# Patient Record
Sex: MALE | Race: WHITE | NOT HISPANIC OR LATINO | Employment: FULL TIME | URBAN - NONMETROPOLITAN AREA
[De-identification: names, ages, dates, MRNs, and addresses within clinical notes are randomized per-mention and may not be internally consistent; named-entity substitution may affect disease eponyms.]

---
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
## 2019-11-26 ENCOUNTER — OFFICE VISIT (OUTPATIENT)
Dept: FAMILY MEDICINE CLINIC | Facility: CLINIC | Age: 19
End: 2019-11-26

## 2019-11-26 VITALS
SYSTOLIC BLOOD PRESSURE: 139 MMHG | DIASTOLIC BLOOD PRESSURE: 86 MMHG | TEMPERATURE: 98.5 F | WEIGHT: 262 LBS | BODY MASS INDEX: 35.49 KG/M2 | HEIGHT: 72 IN | HEART RATE: 79 BPM

## 2019-11-26 DIAGNOSIS — I10 ESSENTIAL HYPERTENSION: Primary | Chronic | ICD-10-CM

## 2019-11-26 DIAGNOSIS — G43.009 MIGRAINE WITHOUT AURA AND WITHOUT STATUS MIGRAINOSUS, NOT INTRACTABLE: Chronic | ICD-10-CM

## 2019-11-26 PROCEDURE — 99213 OFFICE O/P EST LOW 20 MIN: CPT | Performed by: INTERNAL MEDICINE

## 2019-11-26 RX ORDER — VERAPAMIL HYDROCHLORIDE 40 MG/1
20 TABLET ORAL
COMMUNITY
Start: 2019-09-08 | End: 2019-11-26 | Stop reason: SDUPTHER

## 2019-11-26 RX ORDER — VERAPAMIL HYDROCHLORIDE 40 MG/1
20 TABLET ORAL 2 TIMES DAILY
Qty: 60 TABLET | Refills: 5 | Status: SHIPPED | OUTPATIENT
Start: 2019-11-26 | End: 2021-10-19

## 2019-11-26 NOTE — PROGRESS NOTES
Chief Complaint   Patient presents with   • Follow-up     [facility], Migraine     Subjective   Aaron Colunga is a 18 y.o. male who presents to the office for an urgent care follow-up.  He was seen in the urgent care center at [facility] on 9/8/2019.  He was having a headache and was found to have an elevated blood pressure.  He was diagnosed with migraine headache and hypertension.  His blood pressure at that time was 167/84.  He was started on verapamil 20 mg twice daily.  Since then, his blood pressure has been doing well.  He is tolerating the medication without any side effects.  His headaches have also improved significantly.  He occasionally still has a migraine headache, but it responds well to Tylenol.    The following portions of the patient's history were reviewed and updated as appropriate: allergies, current medications, past family history, past medical history, past social history, past surgical history and problem list.    Review of Systems   Constitutional: Negative for chills, fatigue and fever.   HENT: Negative for congestion, sneezing, sore throat and trouble swallowing.    Eyes: Negative for visual disturbance.   Respiratory: Negative for cough, chest tightness, shortness of breath and wheezing.    Cardiovascular: Negative for chest pain, palpitations and leg swelling.   Gastrointestinal: Negative for abdominal pain, constipation, diarrhea, nausea and vomiting.   Genitourinary: Negative for dysuria, frequency and urgency.   Musculoskeletal: Negative for neck pain.   Skin: Negative for rash.   Neurological: Positive for headaches. Negative for dizziness and weakness.   Psychiatric/Behavioral:        Patient denies any feelings of depression and has not felt down, hopeless or lost interest in any activities.   All other systems reviewed and are negative.      Objective   Vitals:    11/26/19 0943   BP: 139/86   BP Location: Left arm   Patient Position: Sitting   Cuff  "Size: Adult   Pulse: 79   Temp: 98.5 °F (36.9 °C)   TempSrc: Oral   Weight: 119 kg (262 lb)   Height: 182.9 cm (72\")   PainSc: 0-No pain      Body mass index is 35.53 kg/m².  Physical Exam   Constitutional: He is oriented to person, place, and time. He appears well-developed and well-nourished. No distress.   HENT:   Head: Normocephalic and atraumatic.   Nose: Nose normal.   Mouth/Throat: Oropharynx is clear and moist. No oropharyngeal exudate.   Eyes: Conjunctivae and EOM are normal. Pupils are equal, round, and reactive to light. No scleral icterus.   Neck: Normal range of motion. Neck supple.   Cardiovascular: Normal rate, regular rhythm and normal heart sounds. Exam reveals no gallop and no friction rub.   No murmur heard.  Pulmonary/Chest: Effort normal and breath sounds normal. No respiratory distress. He has no wheezes. He has no rales.   Abdominal: Soft. Bowel sounds are normal. He exhibits no distension. There is no tenderness. There is no rebound and no guarding.   Musculoskeletal: Normal range of motion. He exhibits no edema.   Lymphadenopathy:     He has no cervical adenopathy.   Neurological: He is alert and oriented to person, place, and time. No cranial nerve deficit.   Skin: Skin is warm and dry. No rash noted.   Psychiatric: He has a normal mood and affect. His behavior is normal. Judgment and thought content normal.   Nursing note and vitals reviewed.      Assessment/Plan   Aaron was seen today for follow-up.    Diagnoses and all orders for this visit:    Essential hypertension      I obtained records from the urgent care visit.  These are reviewed and discussed above in the history of present illness.  He has not had any labs in the past couple of years.  Since he has now developed hypertension, he will come in fasting for updated lab studies.  His blood pressure is controlled.  He will continue with the current dose of verapamil for treatment of the hypertension and migraine " headache.    Patient's Body mass index is 35.53 kg/m². BMI is above normal parameters. Recommendations include: educational material.    Patient does not want a flu shot today.       PHQ-2/PHQ-9 Depression Screening 11/26/2019   Little interest or pleasure in doing things 0   Feeling down, depressed, or hopeless 0   Trouble falling or staying asleep, or sleeping too much -   Feeling tired or having little energy -   Poor appetite or overeating -   Feeling bad about yourself - or that you are a failure or have let yourself or your family down -   Trouble concentrating on things, such as reading the newspaper or watching television -   Moving or speaking so slowly that other people could have noticed. Or the opposite - being so fidgety or restless that you have been moving around a lot more than usual -   Thoughts that you would be better off dead, or of hurting yourself in some way -   Total Score 0   If you checked off any problems, how difficult have these problems made it for you to do your work, take care of things at home, or get along with other people? -

## 2019-11-26 NOTE — PATIENT INSTRUCTIONS

## 2019-12-23 ENCOUNTER — OFFICE VISIT (OUTPATIENT)
Dept: FAMILY MEDICINE CLINIC | Facility: CLINIC | Age: 19
End: 2019-12-23

## 2019-12-23 VITALS
WEIGHT: 264 LBS | BODY MASS INDEX: 35.76 KG/M2 | DIASTOLIC BLOOD PRESSURE: 86 MMHG | OXYGEN SATURATION: 99 % | SYSTOLIC BLOOD PRESSURE: 132 MMHG | HEART RATE: 111 BPM | HEIGHT: 72 IN

## 2019-12-23 DIAGNOSIS — L03.113 RIGHT ARM CELLULITIS: Primary | ICD-10-CM

## 2019-12-23 DIAGNOSIS — Z22.322 MRSA (METHICILLIN RESISTANT STAPH AUREUS) CULTURE POSITIVE: ICD-10-CM

## 2019-12-23 PROCEDURE — 99213 OFFICE O/P EST LOW 20 MIN: CPT | Performed by: NURSE PRACTITIONER

## 2019-12-23 RX ORDER — SULFAMETHOXAZOLE AND TRIMETHOPRIM 800; 160 MG/1; MG/1
TABLET ORAL
COMMUNITY
Start: 2019-12-17 | End: 2020-04-17

## 2019-12-23 RX ORDER — SULFAMETHOXAZOLE AND TRIMETHOPRIM 800; 160 MG/1; MG/1
1 TABLET ORAL
COMMUNITY
Start: 2019-12-17 | End: 2019-12-28

## 2019-12-23 NOTE — PROGRESS NOTES
Subjective   Aaron Colunga is a 19 y.o. male who presents to the office for RIGHT arm cellulitis follow-up and wound check.     Tells me that he visited the [facility] on 12/14 for RIGHT arm cellulitis where he received a Rocephin injection and RIGHT arm xray.  Was told to be seen within 24 hrs if symptoms persisted/worsened which they did so he went to [facility] ER the next day.  WBC noted at 21 which downtitrated to 17 and then 14; blood cx NEGATIVE, wound cx POSITIVE MRSA.  Was given several rounds of IV antibiotics and placed on Bactrim upon discharge.  Tells me that he continues with this medication without adverse side effects.  Has no sequelae from cellulitis.  Denies fever.    History of Present Illness     The following portions of the patient's history were reviewed and updated as appropriate: allergies, current medications, past family history, past medical history, past social history, past surgical history and problem list.    Review of Systems   Constitutional: Negative for chills, fatigue and fever.   HENT: Negative for congestion, sneezing, sore throat and trouble swallowing.    Eyes: Negative for visual disturbance.   Respiratory: Negative for cough, chest tightness, shortness of breath and wheezing.    Cardiovascular: Negative for chest pain, palpitations and leg swelling.   Gastrointestinal: Negative for abdominal pain, constipation, diarrhea, nausea and vomiting.   Genitourinary: Negative for dysuria, frequency and urgency.   Musculoskeletal: Positive for arthralgias (RIGHT arm cellulitis). Negative for neck pain.   Skin: Negative for rash.   Neurological: Negative for dizziness, weakness and headaches.   Psychiatric/Behavioral:        In the past two weeks the pt has not felt down, depressed, hopeless or lost interest in doing things   All other systems reviewed and are negative.      Objective   Physical Exam   Constitutional: He is oriented to person, place, and time. He appears  well-developed and well-nourished. He is cooperative.  Non-toxic appearance. No distress.   HENT:   Head: Normocephalic and atraumatic.   Right Ear: External ear normal.   Left Ear: External ear normal.   Nose: Nose normal.   Mouth/Throat: Oropharynx is clear and moist.   Eyes: Pupils are equal, round, and reactive to light. Conjunctivae and EOM are normal. Right eye exhibits no discharge. Left eye exhibits no discharge. No scleral icterus.   Neck: Normal range of motion. Neck supple. No thyromegaly present.   Cardiovascular: Normal rate, regular rhythm, normal heart sounds and intact distal pulses. Exam reveals no gallop and no friction rub.   No murmur heard.  Pulmonary/Chest: Effort normal and breath sounds normal. No respiratory distress. He has no wheezes. He has no rales.   Abdominal: Soft. Normal appearance and bowel sounds are normal. He exhibits no distension. There is no tenderness. There is no rebound and no guarding.   Musculoskeletal: Normal range of motion. He exhibits no edema.        Right shoulder: He exhibits normal range of motion, no tenderness, no bony tenderness, no swelling, no pain and normal strength.        Right elbow: He exhibits normal range of motion, no swelling and no effusion. No tenderness found.        Right wrist: He exhibits normal range of motion, no tenderness, no bony tenderness and no swelling.   RIGHT arm has full ROM   Lymphadenopathy:     He has no cervical adenopathy.   Neurological: He is alert and oriented to person, place, and time. No cranial nerve deficit. GCS eye subscore is 4. GCS verbal subscore is 5. GCS motor subscore is 6.   Skin: Skin is warm, dry and intact. Capillary refill takes less than 2 seconds. No rash noted.   Psychiatric: He has a normal mood and affect. His behavior is normal. Judgment and thought content normal.   Nursing note and vitals reviewed.      Assessment/Plan   Aaron was seen today for cellulitis and wound check.    Diagnoses and all  orders for this visit:    Right arm cellulitis    MRSA (methicillin resistant staph aureus) culture positive    Informed patient of wound culture status, that it was POSITIVE for MRSA as he had not been notified.  Will continue with Bactrim as ordered.  Should not get out in the sun while on this medication.  Use antibacterial soap in showers.  Verbalized understanding.

## 2020-04-17 ENCOUNTER — TELEMEDICINE (OUTPATIENT)
Dept: FAMILY MEDICINE CLINIC | Facility: CLINIC | Age: 20
End: 2020-04-17

## 2020-04-17 DIAGNOSIS — I10 ESSENTIAL HYPERTENSION: Chronic | ICD-10-CM

## 2020-04-17 DIAGNOSIS — F41.1 GENERALIZED ANXIETY DISORDER: ICD-10-CM

## 2020-04-17 DIAGNOSIS — F32.A MILD DEPRESSION: Primary | ICD-10-CM

## 2020-04-17 DIAGNOSIS — G43.009 MIGRAINE WITHOUT AURA AND WITHOUT STATUS MIGRAINOSUS, NOT INTRACTABLE: ICD-10-CM

## 2020-04-17 PROCEDURE — 99213 OFFICE O/P EST LOW 20 MIN: CPT | Performed by: INTERNAL MEDICINE

## 2020-04-17 NOTE — PROGRESS NOTES
Telemedicine visit    You have chosen to receive care through a telehealth visit.  Do you consent to use a video/audio connection for your medical care today? Yes     Chief Complaint   Patient presents with   • Anxiety     Subjective   Aaron Colunga is a 19 y.o. male who is seen via telehealth video visit for follow-up.  He has mild depression and anxiety which was diagnosed in 2017.  He was previously taking fluoxetine daily.  He was able to find non-medicine ways to help with treatment of his anxiety and depression, and was able to discontinue the fluoxetine.  He currently has a cat and a dog which helps him with anxiety and provide emotional support.  He has recently moved into a new apartment, and is requesting verification for an assistance animal to stay with him in the apartment.  Otherwise he has been doing well, and voices no complaints today.  Since he has obtained ownership of the cat and dog, he has not required medication for treatment of his anxiety and depression.    He has hypertension and migraine headaches and takes verapamil.  This has been working well for him and he is tolerating the medicine without any side effects.  His blood pressure has been doing well.    The following portions of the patient's history were reviewed and updated as appropriate: allergies, current medications, past family history, past medical history, past social history, past surgical history and problem list.    Review of Systems   Constitutional: Negative for chills, fatigue and fever.   HENT: Negative for congestion, sneezing, sore throat and trouble swallowing.    Eyes: Negative for visual disturbance.   Respiratory: Negative for cough, chest tightness, shortness of breath and wheezing.    Cardiovascular: Negative for chest pain, palpitations and leg swelling.   Gastrointestinal: Negative for abdominal pain, constipation, diarrhea, nausea and vomiting.   Genitourinary: Negative for dysuria, frequency and urgency.    Musculoskeletal: Negative for neck pain.   Skin: Negative for rash.   Neurological: Negative for dizziness, weakness and headaches.   Psychiatric/Behavioral:        Patient denies any feelings of depression and has not felt down, hopeless or lost interest in any activities.   All other systems reviewed and are negative.      Objective     Physical Exam   Constitutional: He is oriented to person, place, and time. He appears well-developed and well-nourished. No distress.   HENT:   Head: Normocephalic and atraumatic.   Right Ear: Hearing and external ear normal.   Left Ear: Hearing and external ear normal.   Nose: Nose normal.   Eyes: Pupils are equal, round, and reactive to light. EOM are normal. Right eye exhibits no discharge. Left eye exhibits no discharge.   Neck: Normal range of motion.   Pulmonary/Chest: Effort normal.   Neurological: He is alert and oriented to person, place, and time. No cranial nerve deficit.   Psychiatric: He has a normal mood and affect. His behavior is normal. Judgment and thought content normal.       Assessment/Plan   Aaron was seen today for anxiety.    Diagnoses and all orders for this visit:    Mild depression (CMS/HCC)    Generalized anxiety disorder    Migraine without aura and without status migrainosus, not intractable      I will complete the requested paperwork for verification for an assistance animal.  The dog and cat will help with providing emotional support for treatment of his anxiety and depression.  For now, this is a better option for him in that it allows him to not require any medication for treatment of his anxiety and depression.    He will continue with verapamil for treatment of the migraine headaches and hypertension.           PHQ-2/PHQ-9 Depression Screening 4/17/2020   Little interest or pleasure in doing things 0   Feeling down, depressed, or hopeless 0   Trouble falling or staying asleep, or sleeping too much -   Feeling tired or having little energy -    Poor appetite or overeating -   Feeling bad about yourself - or that you are a failure or have let yourself or your family down -   Trouble concentrating on things, such as reading the newspaper or watching television -   Moving or speaking so slowly that other people could have noticed. Or the opposite - being so fidgety or restless that you have been moving around a lot more than usual -   Thoughts that you would be better off dead, or of hurting yourself in some way -   Total Score 0   If you checked off any problems, how difficult have these problems made it for you to do your work, take care of things at home, or get along with other people? -

## 2021-07-18 ENCOUNTER — HOSPITAL ENCOUNTER (EMERGENCY)
Facility: HOSPITAL | Age: 21
Discharge: HOME | End: 2021-07-18
Attending: STUDENT IN AN ORGANIZED HEALTH CARE EDUCATION/TRAINING PROGRAM | Admitting: STUDENT IN AN ORGANIZED HEALTH CARE EDUCATION/TRAINING PROGRAM

## 2021-07-18 ENCOUNTER — APPOINTMENT (OUTPATIENT)
Dept: CT IMAGING | Facility: HOSPITAL | Age: 21
End: 2021-07-18

## 2021-07-18 VITALS
BODY MASS INDEX: 34.54 KG/M2 | DIASTOLIC BLOOD PRESSURE: 98 MMHG | OXYGEN SATURATION: 97 % | HEIGHT: 72 IN | TEMPERATURE: 98.4 F | RESPIRATION RATE: 20 BRPM | SYSTOLIC BLOOD PRESSURE: 161 MMHG | WEIGHT: 255 LBS | HEART RATE: 92 BPM

## 2021-07-18 DIAGNOSIS — S06.0X0A CONCUSSION WITHOUT LOSS OF CONSCIOUSNESS, INITIAL ENCOUNTER: ICD-10-CM
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
## 2021-07-18 PROCEDURE — 72125 CT NECK SPINE W/O DYE: CPT

## 2021-07-18 PROCEDURE — 99283 EMERGENCY DEPT VISIT LOW MDM: CPT

## 2021-07-18 PROCEDURE — 70450 CT HEAD/BRAIN W/O DYE: CPT

## 2021-07-18 PROCEDURE — 72128 CT CHEST SPINE W/O DYE: CPT
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
## 2021-10-19 ENCOUNTER — OFFICE VISIT (OUTPATIENT)
Dept: FAMILY MEDICINE CLINIC | Facility: CLINIC | Age: 21
End: 2021-10-19

## 2021-10-19 VITALS
TEMPERATURE: 97.1 F | HEIGHT: 72 IN | HEART RATE: 97 BPM | BODY MASS INDEX: 36.03 KG/M2 | WEIGHT: 266 LBS | OXYGEN SATURATION: 97 % | SYSTOLIC BLOOD PRESSURE: 138 MMHG | DIASTOLIC BLOOD PRESSURE: 78 MMHG

## 2021-10-19 DIAGNOSIS — F41.1 GENERALIZED ANXIETY DISORDER: Primary | ICD-10-CM

## 2021-10-19 DIAGNOSIS — F32.A MILD DEPRESSION: ICD-10-CM

## 2021-10-19 DIAGNOSIS — I10 ESSENTIAL HYPERTENSION: Chronic | ICD-10-CM

## 2021-10-19 PROCEDURE — 99214 OFFICE O/P EST MOD 30 MIN: CPT | Performed by: INTERNAL MEDICINE

## 2021-10-19 RX ORDER — FLUOXETINE HYDROCHLORIDE 20 MG/1
20 CAPSULE ORAL DAILY
Qty: 30 CAPSULE | Refills: 1 | Status: SHIPPED | OUTPATIENT
Start: 2021-10-19 | End: 2021-11-22 | Stop reason: SDUPTHER

## 2021-10-19 NOTE — PROGRESS NOTES
Chief Complaint   Patient presents with   • Hypertension   • Anxiety   • Depression     Answers for HPI/ROS submitted by the patient on 10/14/2021  What is the primary reason for your visit?: Other  Please describe your symptoms.: Severe anxiety attacks. , Depression.  Have you had these symptoms before?: Yes  How long have you been having these symptoms?: 5-7 days  Please list any medications you are currently taking for this condition.: N/A  Please describe any probable cause for these symptoms. : N/A        Subjective   Aaron Colunga is a 20 y.o. male who presents to the office for follow-up.  He has anxiety and depression which was diagnosed in 2017.  He has taken fluoxetine in the past, but was able to discontinue the medication.  He was able to find non-medicine ways to help with treatment of his anxiety and depression.  He has a cat and a dog which helps him with anxiety and depression, and they provide emotional support.  For the past couple of weeks, he has noticed an increase in his depression symptoms.  He has also been having anxiety/panic attacks.     He has hypertension and migraine headachesl.  He was previously taking verapamil for these.  He discontinued this medication, and his blood pressure has been doing well without it.    History of Present Illness has been reviewed and validated on 10/19/2021 and updated with any changes.    The following portions of the patient's history were reviewed and updated as appropriate: allergies, current medications, past family history, past medical history, past social history, past surgical history and problem list.    Review of Systems   Constitutional: Negative for chills, fatigue and fever.   HENT: Negative for congestion, sneezing, sore throat and trouble swallowing.    Eyes: Negative for visual disturbance.   Respiratory: Negative for cough, chest tightness, shortness of breath and wheezing.    Cardiovascular: Negative for chest pain, palpitations  "and leg swelling.   Gastrointestinal: Negative for abdominal pain, constipation, diarrhea, nausea and vomiting.   Genitourinary: Negative for dysuria, frequency and urgency.   Musculoskeletal: Negative for neck pain.   Skin: Negative for rash.   Neurological: Negative for dizziness, weakness and headaches.   Psychiatric/Behavioral: The patient is nervous/anxious.    All other systems reviewed and are negative.  Review of systems has been reviewed and validated on 10/19/2021 and updated with any changes.    Objective   Vitals:    10/19/21 0807   BP: 138/78   BP Location: Left arm   Patient Position: Sitting   Cuff Size: Large Adult   Pulse: 97  Comment: regular   Temp: 97.1 °F (36.2 °C)   TempSrc: Tympanic   SpO2: 97%   Weight: 121 kg (266 lb)   Height: 182.9 cm (72\")   PainSc: 0-No pain     Body mass index is 36.08 kg/m².    Physical Exam   Constitutional: He is oriented to person, place, and time. He appears well-developed. No distress.   HENT:   Head: Normocephalic and atraumatic.   Nose: Nose normal.   Eyes: Pupils are equal, round, and reactive to light. Conjunctivae are normal. No scleral icterus.   Cardiovascular: Normal rate, regular rhythm and normal heart sounds. Exam reveals no gallop and no friction rub.   No murmur heard.  Pulmonary/Chest: Effort normal and breath sounds normal. No respiratory distress. He has no wheezes. He has no rales.   Musculoskeletal: Normal range of motion.   Lymphadenopathy:     He has no cervical adenopathy.   Neurological: He is alert and oriented to person, place, and time. No cranial nerve deficit.   Skin: Skin is warm and dry. No rash noted.   Psychiatric: His behavior is normal. Judgment and thought content normal.   Nursing note and vitals reviewed.  Physical exam has been reviewed and validated on 10/19/2021 and updated with any changes.     Assessment/Plan   Diagnoses and all orders for this visit:    1. Generalized anxiety disorder (Primary)  -     FLUoxetine (PROzac) " 20 MG capsule; Take 1 capsule by mouth Daily.  Dispense: 30 capsule; Refill: 1    2. Mild depression (HCC)  -     FLUoxetine (PROzac) 20 MG capsule; Take 1 capsule by mouth Daily.  Dispense: 30 capsule; Refill: 1    3. Essential hypertension      I will start him on fluoxetine 20 mg daily for treatment of anxiety and depression.  If this is helping with symptoms, he will let me know in a month and I will plan to increase it to a 40 mg daily dose at that time.  He will continue to use his dog and cat to help with emotional support/treatment of his anxiety and depression.      His blood pressure is normal today, so he does not need any blood pressure medication at this time.    Patient's Body mass index is 36.08 kg/m². indicating that he is obese (BMI >30). Obesity-related health conditions include the following: hypertension. Obesity is unchanged. BMI is is above average; BMI management plan is completed. We discussed portion control and increasing exercise..         PHQ-2/PHQ-9 Depression Screening 10/19/2021   Little interest or pleasure in doing things 1   Feeling down, depressed, or hopeless 2   Trouble falling or staying asleep, or sleeping too much 3   Feeling tired or having little energy 0   Poor appetite or overeating 3   Feeling bad about yourself - or that you are a failure or have let yourself or your family down 2   Trouble concentrating on things, such as reading the newspaper or watching television 0   Moving or speaking so slowly that other people could have noticed. Or the opposite - being so fidgety or restless that you have been moving around a lot more than usual 3   Thoughts that you would be better off dead, or of hurting yourself in some way 0   Total Score 14   If you checked off any problems, how difficult have these problems made it for you to do your work, take care of things at home, or get along with other people? Very difficult

## 2021-11-19 PROBLEM — F32.A MILD DEPRESSION: Chronic | Status: ACTIVE | Noted: 2020-04-17

## 2021-11-19 PROBLEM — F41.1 GENERALIZED ANXIETY DISORDER: Chronic | Status: ACTIVE | Noted: 2020-04-17

## 2021-11-19 NOTE — PROGRESS NOTES
Chief Complaint   Patient presents with   • Depression   • Anxiety       Subjective   Aaron Colunga is a 20 y.o. male who presents to the office for follow-up.  I saw him on 10/19/2021 with increased symptoms of anxiety and depression.  I started him on fluoxetine 20 mg daily.  His anxiety and depression symptoms have improved since starting the fluoxetine. He has a dog which helps him with anxiety and depression, and they provide emotional support.  At the last visit, he was having episodes of anxiety/panic attacks.  Those have improved since starting fluoxetine.    History of Present Illness has been reviewed and validated on 11/22/2021 and updated with any changes.    The following portions of the patient's history were reviewed and updated as appropriate: allergies, current medications, past family history, past medical history, past social history, past surgical history and problem list.    Review of Systems   Constitutional: Negative for chills, fatigue and fever.   HENT: Negative for congestion, sneezing, sore throat and trouble swallowing.    Eyes: Negative for visual disturbance.   Respiratory: Negative for cough, chest tightness, shortness of breath and wheezing.    Cardiovascular: Negative for chest pain, palpitations and leg swelling.   Gastrointestinal: Negative for abdominal pain, constipation, diarrhea, nausea and vomiting.   Genitourinary: Negative for dysuria, frequency and urgency.   Musculoskeletal: Negative for neck pain.   Skin: Negative for rash.   Neurological: Negative for dizziness, weakness and headaches.   Psychiatric/Behavioral: The patient is nervous/anxious.    All other systems reviewed and are negative.  Review of systems has been reviewed and validated on 11/22/2021 and updated with any changes.    Objective   Vitals:    11/22/21 0956   BP: 130/82   BP Location: Left arm   Patient Position: Sitting   Cuff Size: Large Adult   Pulse: 90  Comment: regular   Temp: 97.9 °F (36.6  "°C)   TempSrc: Tympanic   SpO2: 97%   Weight: 117 kg (259 lb)   Height: 182.9 cm (72\")   PainSc: 0-No pain     Body mass index is 35.13 kg/m².    Physical Exam   Constitutional: He is oriented to person, place, and time. He appears well-developed. No distress.   HENT:   Head: Normocephalic and atraumatic.   Nose: Nose normal.   Eyes: Pupils are equal, round, and reactive to light. Conjunctivae are normal. No scleral icterus.   Cardiovascular: Normal rate, regular rhythm and normal heart sounds. Exam reveals no gallop and no friction rub.   No murmur heard.  Pulmonary/Chest: Effort normal and breath sounds normal. No respiratory distress. He has no wheezes. He has no rales.   Musculoskeletal: Normal range of motion.   Lymphadenopathy:     He has no cervical adenopathy.   Neurological: He is alert and oriented to person, place, and time. No cranial nerve deficit.   Skin: Skin is warm and dry. No rash noted.   Psychiatric: His behavior is normal. Judgment and thought content normal.   Nursing note and vitals reviewed.  Physical exam has been reviewed and validated on 11/22/2021 and updated with any changes.     Assessment/Plan   Diagnoses and all orders for this visit:    1. Mild depression (HCC) (Primary)  -     FLUoxetine (PROzac) 20 MG capsule; Take 1 capsule by mouth Daily.  Dispense: 90 capsule; Refill: 1    2. Generalized anxiety disorder  -     FLUoxetine (PROzac) 20 MG capsule; Take 1 capsule by mouth Daily.  Dispense: 90 capsule; Refill: 1      He will continue with fluoxetine 20 mg daily for treatment of anxiety and depression. He will continue to use his dog and cat to help with emotional support/treatment of his anxiety and depression.  I completed a new emotional support animal request form for him today which will allow him to have a dog(s) in his apartment.           PHQ-2/PHQ-9 Depression Screening 11/22/2021   Little interest or pleasure in doing things 0   Feeling down, depressed, or hopeless 1 "   Trouble falling or staying asleep, or sleeping too much -   Feeling tired or having little energy -   Poor appetite or overeating -   Feeling bad about yourself - or that you are a failure or have let yourself or your family down -   Trouble concentrating on things, such as reading the newspaper or watching television -   Moving or speaking so slowly that other people could have noticed. Or the opposite - being so fidgety or restless that you have been moving around a lot more than usual -   Thoughts that you would be better off dead, or of hurting yourself in some way -   Total Score 1   If you checked off any problems, how difficult have these problems made it for you to do your work, take care of things at home, or get along with other people? -

## 2021-11-22 ENCOUNTER — OFFICE VISIT (OUTPATIENT)
Dept: FAMILY MEDICINE CLINIC | Facility: CLINIC | Age: 21
End: 2021-11-22

## 2021-11-22 VITALS
HEART RATE: 90 BPM | HEIGHT: 72 IN | BODY MASS INDEX: 35.08 KG/M2 | TEMPERATURE: 97.9 F | SYSTOLIC BLOOD PRESSURE: 130 MMHG | OXYGEN SATURATION: 97 % | WEIGHT: 259 LBS | DIASTOLIC BLOOD PRESSURE: 82 MMHG

## 2021-11-22 DIAGNOSIS — F32.A MILD DEPRESSION: Primary | Chronic | ICD-10-CM

## 2021-11-22 DIAGNOSIS — F41.1 GENERALIZED ANXIETY DISORDER: Chronic | ICD-10-CM

## 2021-11-22 PROCEDURE — 99213 OFFICE O/P EST LOW 20 MIN: CPT | Performed by: INTERNAL MEDICINE

## 2021-11-22 RX ORDER — FLUOXETINE HYDROCHLORIDE 20 MG/1
20 CAPSULE ORAL DAILY
Qty: 90 CAPSULE | Refills: 1 | Status: SHIPPED | OUTPATIENT
Start: 2021-11-22 | End: 2022-05-09

## 2022-01-08 ENCOUNTER — HOSPITAL ENCOUNTER (EMERGENCY)
Facility: HOSPITAL | Age: 22
Discharge: HOME | End: 2022-01-08
Attending: FAMILY MEDICINE | Admitting: FAMILY MEDICINE

## 2022-01-08 VITALS
OXYGEN SATURATION: 98 % | BODY MASS INDEX: 34.97 KG/M2 | WEIGHT: 258.2 LBS | TEMPERATURE: 98.1 F | RESPIRATION RATE: 18 BRPM | HEIGHT: 72 IN | HEART RATE: 118 BPM | SYSTOLIC BLOOD PRESSURE: 171 MMHG | DIASTOLIC BLOOD PRESSURE: 97 MMHG

## 2022-01-08 DIAGNOSIS — L03.116 CELLULITIS OF LEFT LOWER EXTREMITY: Primary | ICD-10-CM

## 2022-01-08 PROCEDURE — 99282 EMERGENCY DEPT VISIT SF MDM: CPT

## 2022-01-08 RX ORDER — SULFAMETHOXAZOLE AND TRIMETHOPRIM 800; 160 MG/1; MG/1
1 TABLET ORAL 2 TIMES DAILY
Qty: 14 TABLET | Refills: 0 | Status: SHIPPED | OUTPATIENT
Start: 2022-01-08 | End: 2022-01-15

## 2022-01-09 NOTE — ED PROVIDER NOTES
Subjective   Pt in the ED c/o left lower leg abscess, swelling and erythema, onset 3 days and has gotten worse.       History provided by:  Patient   used: No        Review of Systems   Constitutional: Negative for chills.   HENT: Negative for congestion.    Respiratory: Negative for shortness of breath.    Cardiovascular: Negative for chest pain and palpitations.   Gastrointestinal: Negative for abdominal pain, diarrhea, nausea and vomiting.   Genitourinary: Negative for flank pain.   Musculoskeletal: Negative for gait problem.   Skin: Positive for wound.   Allergic/Immunologic: Negative for immunocompromised state.   Neurological: Negative for weakness.   Hematological: Negative for adenopathy.   Psychiatric/Behavioral: Negative for confusion.   All other systems reviewed and are negative.      Past Medical History:   Diagnosis Date   • Acute sinusitis    • Allergic rhinitis    • Asthma 12/09/2014   • Encounter for routine child health examination without abnormal findings    • Ingrown toenail 07/24/2013   • Upper respiratory infection        Allergies   Allergen Reactions   • Clavulanic Acid Nausea And Vomiting   • Other      SUDAL:  Rash   • Penicillins Itching and Nausea And Vomiting   • Codeine Anxiety     Other reaction(s): RASH / HIVES       Past Surgical History:   Procedure Laterality Date   • ADENOIDECTOMY     • OTHER SURGICAL HISTORY  07/24/2013    Excision of Nail and Nail Matrix, Permanent 17485 (1)   -  YOBANI Byunm   • TOE NAIL AVULSION      great toe bilat   • TONSILLECTOMY     • TYMPANOSTOMY TUBE PLACEMENT Bilateral        Family History   Problem Relation Age of Onset   • Diabetes Mother    • Heart disease Mother    • Hypertension Father    • Hypertension Maternal Grandfather    • Diabetes Paternal Grandmother    • Hypertension Paternal Grandmother    • Diabetes Paternal Grandfather        Social History     Socioeconomic History   • Marital status: Single   Tobacco Use   •  Smoking status: Former Smoker     Packs/day: 0.50     Years: 1.00     Pack years: 0.50     Types: Cigarettes     Quit date: 2017     Years since quittin.1   • Smokeless tobacco: Never Used   • Tobacco comment: Only smoked 1 year   Substance and Sexual Activity   • Alcohol use: No   • Drug use: No   • Sexual activity: Defer           Objective   Physical Exam  Vitals and nursing note reviewed.   Constitutional:       Appearance: He is well-developed.   HENT:      Head: Normocephalic.      Nose: Nose normal.   Eyes:      Conjunctiva/sclera: Conjunctivae normal.      Pupils: Pupils are equal, round, and reactive to light.   Cardiovascular:      Rate and Rhythm: Normal rate and regular rhythm.      Heart sounds: Normal heart sounds.   Pulmonary:      Effort: Pulmonary effort is normal.      Breath sounds: Normal breath sounds.   Abdominal:      Palpations: Abdomen is soft.   Musculoskeletal:         General: Normal range of motion.      Cervical back: Normal range of motion.        Legs:    Skin:     General: Skin is warm and dry.      Findings: Lesion present.   Neurological:      Mental Status: He is alert and oriented to person, place, and time.      GCS: GCS eye subscore is 4. GCS verbal subscore is 5. GCS motor subscore is 6.         Procedures           ED Course    placed on bactrim follow with pcp                                              MDM    Final diagnoses:   Cellulitis of left lower extremity       ED Disposition  ED Disposition     ED Disposition Condition Comment    Discharge Stable           Charly Thao MD  [address] [place] [address] DR Gem MARTINEZ [id number] [phone]    Call in 1 week           Medication List      New Prescriptions    sulfamethoxazole-trimethoprim 800-160 MG per tablet  Commonly known as: BACTRIM DS,SEPTRA DS  Take 1 tablet by mouth 2 (Two) Times a Day for 7 days.           Where to Get Your Medications      These medications were sent to [facility] -  [place], [place] - [address] [place] [address] [address] AT [facility] &  [address] - [phone]  - [phone]   [address] [place] [address] [address], [facility] [address]    Phone: [phone]   · sulfamethoxazole-trimethoprim 800-160 MG per tablet          Mian Gonzalez, APRN  01/08/22 5225

## 2022-05-07 PROBLEM — L03.113 RIGHT ARM CELLULITIS: Status: RESOLVED | Noted: 2019-12-23 | Resolved: 2022-05-07

## 2022-05-07 PROBLEM — Z22.322 MRSA (METHICILLIN RESISTANT STAPH AUREUS) CULTURE POSITIVE: Status: RESOLVED | Noted: 2019-12-23 | Resolved: 2022-05-07

## 2022-05-09 ENCOUNTER — OFFICE VISIT (OUTPATIENT)
Dept: FAMILY MEDICINE CLINIC | Facility: CLINIC | Age: 22
End: 2022-05-09

## 2022-05-09 VITALS
BODY MASS INDEX: 38.87 KG/M2 | HEIGHT: 72 IN | DIASTOLIC BLOOD PRESSURE: 80 MMHG | OXYGEN SATURATION: 98 % | TEMPERATURE: 97.8 F | SYSTOLIC BLOOD PRESSURE: 126 MMHG | HEART RATE: 65 BPM | WEIGHT: 287 LBS

## 2022-05-09 DIAGNOSIS — F32.A MILD DEPRESSION: Primary | Chronic | ICD-10-CM

## 2022-05-09 DIAGNOSIS — E66.09 CLASS 2 OBESITY DUE TO EXCESS CALORIES WITHOUT SERIOUS COMORBIDITY WITH BODY MASS INDEX (BMI) OF 38.0 TO 38.9 IN ADULT: ICD-10-CM

## 2022-05-09 DIAGNOSIS — T88.7XXA MEDICATION SIDE EFFECT: ICD-10-CM

## 2022-05-09 DIAGNOSIS — F41.1 GENERALIZED ANXIETY DISORDER: Chronic | ICD-10-CM

## 2022-05-09 PROCEDURE — 99214 OFFICE O/P EST MOD 30 MIN: CPT | Performed by: INTERNAL MEDICINE

## 2022-05-09 RX ORDER — VENLAFAXINE HYDROCHLORIDE 75 MG/1
75 CAPSULE, EXTENDED RELEASE ORAL DAILY
Qty: 30 CAPSULE | Refills: 1 | Status: SHIPPED | OUTPATIENT
Start: 2022-05-16 | End: 2022-05-13

## 2022-05-09 RX ORDER — VENLAFAXINE HYDROCHLORIDE 37.5 MG/1
37.5 CAPSULE, EXTENDED RELEASE ORAL DAILY
Qty: 7 CAPSULE | Refills: 0 | Status: SHIPPED | OUTPATIENT
Start: 2022-05-09 | End: 2022-05-13

## 2022-05-09 NOTE — PROGRESS NOTES
Chief Complaint   Patient presents with   • Depression   • Anxiety     Answers for HPI/ROS submitted by the patient on 5/5/2022  What is the primary reason for your visit?: Other  Please describe your symptoms.: Mental health. Fluexitine side effects.  Have you had these symptoms before?: Yes  How long have you been having these symptoms?: Greater than 2 weeks  Please list any medications you are currently taking for this condition.: Fluextine  Please describe any probable cause for these symptoms. : Medication        Subjective   Aaron Colunga is a 21 y.o. male who presents to the office for follow-up.  I saw him on 10/19/2021 with increased symptoms of anxiety and depression.  I started him on fluoxetine 20 mg daily.  His anxiety and depression symptoms improved after starting the fluoxetine.  He has recently started experiencing side effects due to the fluoxetine.  He has been having insomnia along with nausea and vomiting.  These symptoms resolved after discontinuing fluoxetine.  He has been off of the medication for 5 days.  He is asking about GeneSight testing and/or starting a different medication for treatment of his anxiety and depression.    History of Present Illness has been reviewed and validated on 05/09/2022 and updated with any changes.    The following portions of the patient's history were reviewed and updated as appropriate: allergies, current medications, past family history, past medical history, past social history, past surgical history and problem list.    Review of Systems   Constitutional: Negative for chills, fatigue and fever.   HENT: Negative for congestion, sneezing, sore throat and trouble swallowing.    Eyes: Negative for visual disturbance.   Respiratory: Negative for cough, chest tightness, shortness of breath and wheezing.    Cardiovascular: Negative for chest pain, palpitations and leg swelling.   Gastrointestinal: Negative for abdominal pain, constipation, diarrhea,  "nausea and vomiting.   Genitourinary: Negative for dysuria, frequency and urgency.   Musculoskeletal: Negative for neck pain.   Skin: Negative for rash.   Neurological: Negative for dizziness, weakness and headaches.   Psychiatric/Behavioral: The patient is nervous/anxious.    All other systems reviewed and are negative.  Review of systems has been reviewed and validated on 05/09/2022 and updated with any changes.    Objective   Vitals:    05/09/22 1145   BP: 126/80   BP Location: Left arm   Patient Position: Sitting   Cuff Size: Large Adult   Pulse: 65  Comment: regular   Temp: 97.8 °F (36.6 °C)   TempSrc: Temporal   SpO2: 98%   Weight: 130 kg (287 lb)   Height: 182.9 cm (72\")   PainSc: 0-No pain     Body mass index is 38.92 kg/m².    Physical Exam   Constitutional: He is oriented to person, place, and time. He appears well-developed. No distress.   HENT:   Head: Normocephalic and atraumatic.   Nose: Nose normal.   Eyes: Pupils are equal, round, and reactive to light. Conjunctivae are normal. No scleral icterus.   Cardiovascular: Normal rate, regular rhythm and normal heart sounds. Exam reveals no gallop and no friction rub.   No murmur heard.  Pulmonary/Chest: Effort normal and breath sounds normal. No respiratory distress. He has no wheezes. He has no rales.   Musculoskeletal: Normal range of motion.   Lymphadenopathy:     He has no cervical adenopathy.   Neurological: He is alert and oriented to person, place, and time. No cranial nerve deficit.   Skin: Skin is warm and dry. No rash noted.   Psychiatric: His behavior is normal. Judgment and thought content normal.   Nursing note and vitals reviewed.  Physical exam has been reviewed and validated on 05/09/2022 and updated with any changes.     Assessment & Plan   Diagnoses and all orders for this visit:    1. Mild depression (HCC) (Primary)  -     GeneSight - Swab,; Future  -     Discontinue: venlafaxine XR (EFFEXOR-XR) 37.5 MG 24 hr capsule; Take 1 capsule by " mouth Daily for 7 days. then increase to 75 mg daily.  Dispense: 7 capsule; Refill: 0  -     Discontinue: venlafaxine XR (Effexor XR) 75 MG 24 hr capsule; Take 1 capsule by mouth Daily. Start after completing the 37.5 mg dose.  Dispense: 30 capsule; Refill: 1    2. Generalized anxiety disorder  -     GeneSight - Swab,; Future  -     Discontinue: venlafaxine XR (EFFEXOR-XR) 37.5 MG 24 hr capsule; Take 1 capsule by mouth Daily for 7 days. then increase to 75 mg daily.  Dispense: 7 capsule; Refill: 0  -     Discontinue: venlafaxine XR (Effexor XR) 75 MG 24 hr capsule; Take 1 capsule by mouth Daily. Start after completing the 37.5 mg dose.  Dispense: 30 capsule; Refill: 1    3. Medication side effect  -     GeneSight - Swab,; Future    4. Class 2 obesity due to excess calories without serious comorbidity with body mass index (BMI) of 38.0 to 38.9 in adult      I discussed GeneSight testing with him today, and he would like to proceed with this.  I will start Effexor XR 37.5 mg daily x1 week then increase to 75 mg daily.  I will follow-up with him once I have results of the GeneSight testing.    Class 2 Severe Obesity (BMI >=35 and <=39.9). Obesity-related health conditions include the following: hypertension. Obesity is unchanged. BMI is is above average; BMI management plan is completed. We discussed portion control and increasing exercise.           PHQ-2/PHQ-9 Depression Screening 5/9/2022   Retired PHQ-9 Total Score -   Retired Total Score -   Little Interest or Pleasure in Doing Things 0-->not at all   Feeling Down, Depressed or Hopeless 1-->several days   PHQ-9: Brief Depression Severity Measure Score 1

## 2022-05-13 ENCOUNTER — TELEPHONE (OUTPATIENT)
Dept: FAMILY MEDICINE CLINIC | Facility: CLINIC | Age: 22
End: 2022-05-13

## 2022-05-13 DIAGNOSIS — F32.A MILD DEPRESSION: Chronic | ICD-10-CM

## 2022-05-13 DIAGNOSIS — F41.1 GENERALIZED ANXIETY DISORDER: Chronic | ICD-10-CM

## 2022-05-13 NOTE — TELEPHONE ENCOUNTER
----- Message from Charly Thao MD sent at 5/13/2022 12:02 PM CDT -----  Notify patient that I received results of his GeneSight testing.  He was having side effects with fluoxetine.  His test shows significant gene-drug interaction with fluoxetine.  At the last visit I changed this to Effexor.  His GeneSight test also shows significant gene-drug interaction with Effexor.  He needs to discontinue Effexor and start Zoloft.  Send a prescription for Zoloft 50 mg, 1/2 tablet daily x1 week then increase to 1 tablet daily.

## 2022-05-13 NOTE — TELEPHONE ENCOUNTER
I read Dr Thao's message to the patient. The patient voiced understanding, had no additional questions, and thanked me for the call.

## 2022-08-29 ENCOUNTER — TRANSCRIBE ORDERS (OUTPATIENT)
Dept: PODIATRY | Facility: CLINIC | Age: 22
End: 2022-08-29

## 2022-08-29 DIAGNOSIS — M79.672 LEFT FOOT PAIN: Primary | ICD-10-CM

## 2022-09-14 ENCOUNTER — OFFICE VISIT (OUTPATIENT)
Dept: PODIATRY | Facility: CLINIC | Age: 22
End: 2022-09-14

## 2022-09-14 VITALS — BODY MASS INDEX: 38.87 KG/M2 | WEIGHT: 287 LBS | HEART RATE: 80 BPM | HEIGHT: 72 IN | OXYGEN SATURATION: 99 %

## 2022-09-14 DIAGNOSIS — S99.922A INJURY OF LEFT FOOT, INITIAL ENCOUNTER: Primary | ICD-10-CM

## 2022-09-14 PROCEDURE — 99203 OFFICE O/P NEW LOW 30 MIN: CPT | Performed by: PODIATRIST

## 2022-09-14 NOTE — PROGRESS NOTES
Aaron Colunga  2000  21 y.o. male      2022    Chief Complaint   Patient presents with   • Left Foot - Pain       History of Present Illness    Aaron Colunga is a 21 y.o.male presents to the clinic today with chief complaint of left foot pain. Patient states he was playing video games when his chair broke causing him to fall back and kick his desk on 2022. Xray's obtained today.       Past Medical History:   Diagnosis Date   • Acute sinusitis    • Allergic rhinitis    • Asthma 2014   • Encounter for routine child health examination without abnormal findings    • Ingrown toenail 2013   • Right arm cellulitis 2019   • Upper respiratory infection          Past Surgical History:   Procedure Laterality Date   • ADENOIDECTOMY     • OTHER SURGICAL HISTORY  2013    Excision of Nail and Nail Matrix, Permanent 25461 1)   -  YOBANI Bynum   • TOE NAIL AVULSION      great toe bilat   • TONSILLECTOMY     • TYMPANOSTOMY TUBE PLACEMENT Bilateral          Family History   Problem Relation Age of Onset   • Diabetes Mother    • Heart disease Mother    • Hypertension Father    • Hypertension Maternal Grandfather    • Diabetes Paternal Grandmother    • Hypertension Paternal Grandmother    • Diabetes Paternal Grandfather        Allergies   Allergen Reactions   • Clavulanic Acid Nausea And Vomiting   • Other      SUDAL:  Rash   • Penicillins Itching and Nausea And Vomiting   • Codeine Anxiety     Other reaction(s): RASH / HIVES       Social History     Socioeconomic History   • Marital status: Single   Tobacco Use   • Smoking status: Former Smoker     Packs/day: 0.50     Years: 1.00     Pack years: 0.50     Types: Cigarettes     Quit date: 2017     Years since quittin.8   • Smokeless tobacco: Never Used   • Tobacco comment: Only smoked 1 year   Substance and Sexual Activity   • Alcohol use: No   • Drug use: No   • Sexual activity: Defer         Current Outpatient Medications  "  Medication Sig Dispense Refill   • sertraline (Zoloft) 50 MG tablet Take 1/2 tablet for 1 week then increase dose to 1 tablet daily. 30 tablet 5     No current facility-administered medications for this visit.       Review of Systems   Constitutional: Negative.    HENT: Negative.    Eyes: Negative.    Respiratory: Negative.    Cardiovascular: Negative.    Gastrointestinal: Negative.    Endocrine: Negative.    Genitourinary: Negative.    Skin: Negative.    Allergic/Immunologic: Negative.    Neurological: Negative.    Hematological: Negative.    Psychiatric/Behavioral: Negative.          OBJECTIVE    Pulse 80   Ht 182.9 cm (72\")   Wt 130 kg (287 lb)   SpO2 99%   BMI 38.92 kg/m²     Physical Exam  Vitals reviewed.   Constitutional:       General: He is not in acute distress.     Appearance: He is well-developed.   HENT:      Head: Normocephalic and atraumatic.      Nose: Nose normal.   Eyes:      Conjunctiva/sclera: Conjunctivae normal.      Pupils: Pupils are equal, round, and reactive to light.   Cardiovascular:      Pulses:           Dorsalis pedis pulses are 2+ on the left side.        Posterior tibial pulses are 2+ on the left side.   Pulmonary:      Effort: Pulmonary effort is normal. No respiratory distress.      Breath sounds: No wheezing.   Musculoskeletal:      Left foot: Normal range of motion. No deformity, bunion, Charcot foot, foot drop or prominent metatarsal heads.   Feet:      Left foot:      Skin integrity: Skin integrity normal.      Toenail Condition: Left toenails are normal.   Skin:     General: Skin is warm and dry.      Capillary Refill: Capillary refill takes less than 2 seconds.   Neurological:      Mental Status: He is alert and oriented to person, place, and time.   Psychiatric:         Behavior: Behavior normal.         Thought Content: Thought content normal.                Procedures        ASSESSMENT AND PLAN    Diagnoses and all orders for this visit:    1. Injury of left foot, " initial encounter (Primary)  -     XR Foot 3+ View Left        - Patient examined and evaluated  -Radiographs taken reviewed.  No acute osseous or articular pathology.  -Transition out of cam boot into athletic shoe gear.  Progress activity as tolerated without restriction.  - All questions were answered   - RTC as needed            This document has been electronically signed by Jacob Will DPM on September 14, 2022 14:03 CDT     9/14/2022  14:03 CDT

## 2023-02-28 ENCOUNTER — OFFICE VISIT (OUTPATIENT)
Dept: FAMILY MEDICINE CLINIC | Facility: CLINIC | Age: 23
End: 2023-02-28
Payer: COMMERCIAL

## 2023-02-28 ENCOUNTER — LAB (OUTPATIENT)
Dept: LAB | Facility: HOSPITAL | Age: 23
End: 2023-02-28
Payer: COMMERCIAL

## 2023-02-28 VITALS
BODY MASS INDEX: 41.65 KG/M2 | DIASTOLIC BLOOD PRESSURE: 78 MMHG | HEART RATE: 94 BPM | HEIGHT: 72 IN | SYSTOLIC BLOOD PRESSURE: 144 MMHG | WEIGHT: 307.5 LBS | TEMPERATURE: 98 F | OXYGEN SATURATION: 96 %

## 2023-02-28 DIAGNOSIS — Z11.59 NEED FOR HEPATITIS C SCREENING TEST: ICD-10-CM

## 2023-02-28 DIAGNOSIS — F41.1 GENERALIZED ANXIETY DISORDER: Chronic | ICD-10-CM

## 2023-02-28 DIAGNOSIS — Z00.00 ENCOUNTER FOR MEDICAL EXAMINATION TO ESTABLISH CARE: Primary | ICD-10-CM

## 2023-02-28 DIAGNOSIS — Z00.00 ENCOUNTER FOR MEDICAL EXAMINATION TO ESTABLISH CARE: ICD-10-CM

## 2023-02-28 DIAGNOSIS — F32.A MILD DEPRESSION: Chronic | ICD-10-CM

## 2023-02-28 DIAGNOSIS — E66.01 MORBID (SEVERE) OBESITY DUE TO EXCESS CALORIES: ICD-10-CM

## 2023-02-28 LAB
AMPHET+METHAMPHET UR QL: NEGATIVE
AMPHETAMINES UR QL: NEGATIVE
BARBITURATES UR QL SCN: NEGATIVE
BENZODIAZ UR QL SCN: NEGATIVE
BUPRENORPHINE SERPL-MCNC: NEGATIVE NG/ML
CANNABINOIDS SERPL QL: NEGATIVE
COCAINE UR QL: NEGATIVE
METHADONE UR QL SCN: NEGATIVE
OPIATES UR QL: NEGATIVE
OXYCODONE UR QL SCN: NEGATIVE
PCP UR QL SCN: NEGATIVE
PROPOXYPH UR QL: NEGATIVE
TRICYCLICS UR QL SCN: NEGATIVE

## 2023-02-28 PROCEDURE — 80306 DRUG TEST PRSMV INSTRMNT: CPT

## 2023-02-28 PROCEDURE — 84443 ASSAY THYROID STIM HORMONE: CPT | Performed by: STUDENT IN AN ORGANIZED HEALTH CARE EDUCATION/TRAINING PROGRAM

## 2023-02-28 PROCEDURE — 36415 COLL VENOUS BLD VENIPUNCTURE: CPT

## 2023-02-28 PROCEDURE — 85025 COMPLETE CBC W/AUTO DIFF WBC: CPT

## 2023-02-28 PROCEDURE — 86803 HEPATITIS C AB TEST: CPT

## 2023-02-28 PROCEDURE — 83036 HEMOGLOBIN GLYCOSYLATED A1C: CPT

## 2023-02-28 PROCEDURE — 80061 LIPID PANEL: CPT | Performed by: STUDENT IN AN ORGANIZED HEALTH CARE EDUCATION/TRAINING PROGRAM

## 2023-02-28 NOTE — PROGRESS NOTES
Family Medicine Residency  Miguel Padilla MD    Subjective:     Aaron Colunga is a 22 y.o. male who presents for establish care, depression and management of medications. Overall pt reports stable on current medications for mental health and overall doing well in ADL. Pt would like basic lab tests.    The following portions of the patient's history were reviewed and updated as appropriate: allergies, current medications, past family history, past medical history, past social history, past surgical history and problem list.    Past Medical Hx:  Past Medical History:   Diagnosis Date   • Acute sinusitis    • Allergic rhinitis    • Asthma 12/09/2014   • Encounter for routine child health examination without abnormal findings    • Ingrown toenail 07/24/2013   • Right arm cellulitis 12/23/2019   • Upper respiratory infection        Past Surgical Hx:  Past Surgical History:   Procedure Laterality Date   • ADENOIDECTOMY     • OTHER SURGICAL HISTORY  07/24/2013    Excision of Nail and Nail Matrix, Permanent 33611 (1)   -  YOBANI Bynum   • TOE NAIL AVULSION      great toe bilat   • TONSILLECTOMY     • TYMPANOSTOMY TUBE PLACEMENT Bilateral        Current Meds:    Current Outpatient Medications:   •  sertraline (Zoloft) 50 MG tablet, Take 1/2 tablet for 1 week then increase dose to 1 tablet daily., Disp: 30 tablet, Rfl: 5    Allergies:  Allergies   Allergen Reactions   • Clavulanic Acid Nausea And Vomiting   • Other      SUDAL:  Rash   • Penicillins Itching and Nausea And Vomiting   • Codeine Anxiety     Other reaction(s): RASH / HIVES       Family Hx:  Family History   Problem Relation Age of Onset   • Diabetes Mother    • Heart disease Mother    • Hypertension Father    • Hypertension Maternal Grandfather    • Diabetes Paternal Grandmother    • Hypertension Paternal Grandmother    • Diabetes Paternal Grandfather         Social History:  Social History     Socioeconomic History   • Marital status: Single   Tobacco Use  "  • Smoking status: Former     Packs/day: 0.50     Years: 1.00     Pack years: 0.50     Types: Cigarettes     Quit date: 2017     Years since quittin.2   • Smokeless tobacco: Never   • Tobacco comments:     Only smoked 1 year   Substance and Sexual Activity   • Alcohol use: No   • Drug use: No   • Sexual activity: Defer       Review of Systems  Review of Systems   Constitutional: Negative for activity change, appetite change, chills, diaphoresis, fatigue and fever.   HENT: Negative for congestion, dental problem, ear discharge, ear pain, hearing loss, mouth sores, nosebleeds, postnasal drip, sinus pain, sore throat, tinnitus, trouble swallowing and voice change.    Eyes: Negative for photophobia, pain, discharge and itching.   Respiratory: Negative for cough, choking, chest tightness, shortness of breath and wheezing.    Cardiovascular: Negative for chest pain, palpitations and leg swelling.   Gastrointestinal: Negative for abdominal distention, abdominal pain, blood in stool, constipation, diarrhea, nausea and vomiting.   Endocrine: Negative for cold intolerance and heat intolerance.   Genitourinary: Negative for difficulty urinating, dysuria, flank pain and hematuria.   Musculoskeletal: Negative for back pain, joint swelling and neck pain.   Skin: Negative for color change and rash.   Neurological: Negative for dizziness, tremors, seizures, weakness, light-headedness, numbness and headaches.   Hematological: Negative for adenopathy.   Psychiatric/Behavioral: Negative for behavioral problems, confusion, hallucinations, self-injury and sleep disturbance.       Objective:     /78   Pulse 94   Temp 98 °F (36.7 °C)   Ht 182.9 cm (72\")   Wt (!) 139 kg (307 lb 8 oz)   SpO2 96%   BMI 41.70 kg/m²   Physical Exam  Vitals and nursing note reviewed.   Constitutional:       Appearance: Normal appearance. He is not ill-appearing or diaphoretic.   HENT:      Head: Normocephalic and atraumatic.      Right " Ear: External ear normal.      Left Ear: External ear normal.      Nose: Nose normal. No rhinorrhea.      Mouth/Throat:      Mouth: Mucous membranes are moist.      Pharynx: No posterior oropharyngeal erythema.   Eyes:      General: No scleral icterus.        Right eye: No discharge.         Left eye: No discharge.      Extraocular Movements: Extraocular movements intact.   Neck:      Vascular: No carotid bruit.   Cardiovascular:      Rate and Rhythm: Normal rate and regular rhythm.      Pulses: Normal pulses.      Heart sounds: Normal heart sounds.     No gallop.   Pulmonary:      Effort: Pulmonary effort is normal.      Breath sounds: Normal breath sounds. No wheezing.   Chest:      Chest wall: No tenderness.   Abdominal:      General: Bowel sounds are normal.      Palpations: Abdomen is soft.      Tenderness: There is no rebound.   Musculoskeletal:         General: No swelling.      Cervical back: No muscular tenderness.      Right lower leg: No edema.      Left lower leg: No edema.   Lymphadenopathy:      Cervical: No cervical adenopathy.   Skin:     General: Skin is warm and dry.      Capillary Refill: Capillary refill takes less than 2 seconds.      Findings: No erythema or rash.   Neurological:      General: No focal deficit present.      Mental Status: He is alert and oriented to person, place, and time.      Motor: No weakness.   Psychiatric:         Mood and Affect: Mood normal.         Behavior: Behavior normal.         Thought Content: Thought content normal.         Judgment: Judgment normal.      Comments: Stable on zoloft          Assessment   Aaron Colunga is a 22 y.o. male who presents for:    Establish Care Labs:  CBC, CMP, Lipid Panel, HBA1C, UDS, TSH, HEP C  Educated patient in relation to obtaining lab tests.    BMI/Weight management:  Educated patient on diet.  Educated patient on exercise.  Educated on healthcare benefits.    Care Gap Closure:  Vaccinations- discussed and educated  patient.    The patient was educated on the risks, benefits and alternatives to therapy. The patient was in agreement with therapy and plan of care.    Plan     Next visit LAB REVIEW  Next visit CHECK STATUS ON ZOLOFT  Next visit check patients medication regime.  Next visit check patients compliance to medication regime.    Diagnoses and all orders for this visit:    1. Encounter for medical examination to establish care (Primary)  -     Hemoglobin A1c; Future  -     CBC w AUTO Differential; Future  -     Urine Drug Screen - Urine, Clean Catch; Future  -     TSH  -     Lipid Panel    2. Mild depression (HCC)  -     sertraline (Zoloft) 50 MG tablet; Take 1/2 tablet for 1 week then increase dose to 1 tablet daily.  Dispense: 30 tablet; Refill: 5    3. Generalized anxiety disorder  -     sertraline (Zoloft) 50 MG tablet; Take 1/2 tablet for 1 week then increase dose to 1 tablet daily.  Dispense: 30 tablet; Refill: 5    4. Need for hepatitis C screening test  -     Hepatitis C Antibody; Future    5. Morbid (severe) obesity due to excess calories (HCC)      · Rx changes: As above  · Patient Education: Advised and educated the patient in relation to diet, lifestyle, healthcare goals and educated on mental health management.  · Compliance at present is estimated to be good.   · Efforts to improve compliance (if necessary) will be directed at increased exercise.    Depression screening: Depression screening performed today; result negative; no follow up needed       PHQ-2 Depression Screening  Little interest or pleasure in doing things? 0-->not at all   Feeling down, depressed, or hopeless? 0-->not at all   PHQ-2 Total Score 0        PHQ-2 Total Score: 0      PHQ-9 Depression Screening  Little interest or pleasure in doing things? 0-->not at all   Feeling down, depressed, or hopeless? 0-->not at all   Trouble falling or staying asleep, or sleeping too much?     Feeling tired or having little energy?     Poor appetite or  overeating?     Feeling bad about yourself - or that you are a failure or have let yourself or your family down?     Trouble concentrating on things, such as reading the newspaper or watching television?     Moving or speaking so slowly that other people could have noticed? Or the opposite - being so fidgety or restless that you have been moving around a lot more than usual?     Thoughts that you would be better off dead, or of hurting yourself in some way?     PHQ-9 Total Score 0   If you checked off any problems, how difficult have these problems made it for you to do your work, take care of things at home, or get along with other people?        PHQ-9 Total Score: 0      Patient has no suicidal, no homicidal ideation.    Patient reports doing well on current medication regime. Patient is currently medicated with Zoloft 50 mg as per [facility] testing in 2022.      Patient reports would not like input for mental health at this point in time.     Patient has been offered psychiatric and psychological therapy input however has refused.    Follow-up:     Return in about 2 weeks (around 3/14/2023), or Lab results, for Recheck.    Preventative:  Health Maintenance   Topic Date Due   • HEPATITIS C SCREENING  Never done   • ANNUAL PHYSICAL  08/11/2018   • Pneumococcal Vaccine 0-64 (1 - PCV) 02/28/2023 (Originally 12/5/2005)   • INFLUENZA VACCINE  03/31/2023 (Originally 8/1/2022)   • COVID-19 Vaccine (3 - Booster) 09/20/2023 (Originally 8/12/2021)   • TDAP/TD VACCINES (2 - Td or Tdap) 09/20/2023 (Originally 6/3/2021)     Male Preventative: Patient does Testicular self exam  Recommended: none  Vaccine Counseling: N/A    Weight  -Class: Obese Class III extreme obesity: > or equal to 40kg/m2  -Class 3 Severe Obesity (BMI >=40). Obesity-related health conditions include the following: none. Obesity is newly identified. BMI is is above average; BMI management plan is completed. We discussed portion control and increasing  exercise.   eat more fruits and vegetables, decrease soda or juice intake, increase water intake, increase physical activity, reduce screen time, reduce portion size, cut out extra servings and reduce fast food intake    Alcohol use:  reports no history of alcohol use.  Nicotine status  reports that he quit smoking about 5 years ago. His smoking use included cigarettes. He has a 0.50 pack-year smoking history. He has never used smokeless tobacco.     Goals    None         RISK SCORE: 1    [facility] request # [id number]. Reviewed and appropriate.     Signature  [place] [address]  Office: [phone]      This document has been electronically signed by Miguel Padilla MD on February 28, 2023 14:58 CST      Portions of this note were copied from progress note written by Dr. Padilla; note reviewed and updated as appropriate.    Part of this note may be an electronic transcription/translation of spoken language to printed text using the Dragon Dictation System.

## 2023-03-01 ENCOUNTER — DOCUMENTATION (OUTPATIENT)
Dept: FAMILY MEDICINE CLINIC | Facility: CLINIC | Age: 23
End: 2023-03-01
Payer: COMMERCIAL

## 2023-03-01 LAB
BASOPHILS # BLD AUTO: 0.09 10*3/MM3 (ref 0–0.2)
BASOPHILS NFR BLD AUTO: 0.7 % (ref 0–1.5)
CHOLEST SERPL-MCNC: 181 MG/DL (ref 0–200)
DEPRECATED RDW RBC AUTO: 38 FL (ref 37–54)
EOSINOPHIL # BLD AUTO: 0.54 10*3/MM3 (ref 0–0.4)
EOSINOPHIL NFR BLD AUTO: 4.4 % (ref 0.3–6.2)
ERYTHROCYTE [DISTWIDTH] IN BLOOD BY AUTOMATED COUNT: 13.4 % (ref 12.3–15.4)
HBA1C MFR BLD: 6.2 % (ref 4.8–5.6)
HCT VFR BLD AUTO: 46.8 % (ref 37.5–51)
HCV AB SER DONR QL: NORMAL
HDLC SERPL-MCNC: 26 MG/DL (ref 40–60)
HGB BLD-MCNC: 15.5 G/DL (ref 13–17.7)
LDLC SERPL CALC-MCNC: 89 MG/DL (ref 0–100)
LDLC/HDLC SERPL: 2.89 {RATIO}
LYMPHOCYTES # BLD AUTO: 3.28 10*3/MM3 (ref 0.7–3.1)
LYMPHOCYTES NFR BLD AUTO: 26.6 % (ref 19.6–45.3)
MCH RBC QN AUTO: 26.2 PG (ref 26.6–33)
MCHC RBC AUTO-ENTMCNC: 33.1 G/DL (ref 31.5–35.7)
MCV RBC AUTO: 79.2 FL (ref 79–97)
MONOCYTES # BLD AUTO: 1.46 10*3/MM3 (ref 0.1–0.9)
MONOCYTES NFR BLD AUTO: 11.8 % (ref 5–12)
NEUTROPHILS NFR BLD AUTO: 56.1 % (ref 42.7–76)
NEUTROPHILS NFR BLD AUTO: 6.91 10*3/MM3 (ref 1.7–7)
PLATELET # BLD AUTO: 299 10*3/MM3 (ref 140–450)
PMV BLD AUTO: 13 FL (ref 6–12)
RBC # BLD AUTO: 5.91 10*6/MM3 (ref 4.14–5.8)
TRIGL SERPL-MCNC: 399 MG/DL (ref 0–150)
TSH SERPL DL<=0.05 MIU/L-ACNC: 1.64 UIU/ML (ref 0.27–4.2)
VLDLC SERPL-MCNC: 66 MG/DL (ref 5–40)
WBC NRBC COR # BLD: 12.33 10*3/MM3 (ref 3.4–10.8)

## 2023-03-01 NOTE — PROGRESS NOTES
Time of phone conversation: 08:37 CST 3/1/2023       Aaron Colunga is a 22 y.o. y.o. male  who I contacted in relation to their recent Blood  results.      The results were as follows:   Component  Ref Range & Units 1 d ago   Hemoglobin A1C  4.80 - 5.60 % 6.20 High       Component  Ref Range & Units 1 d ago 6 yr ago   Total Cholesterol  0 - 200 mg/dL 181  143 Low  R    Triglycerides  0 - 150 mg/dL 399 High   249 High  R    HDL Cholesterol  40 - 60 mg/dL 26 Low   22 Low  R    LDL Cholesterol   0 - 100 mg/dL 89  71 R    VLDL Cholesterol  5 - 40 mg/dL 66 High   49.8 R    LDL/HDL Ratio 2.89  3.24    Resulting Agency [facility]            The patient was consulted appropriately. The patient had full understanding and awareness in relation to their results.     Aaron Colunga is happy to return in 2 week to recheck their results and any further consultation deemed necessary.      At the end of our phone conversation Aaron Colunga was happy, content and will continue with their healthy eating and exercise plan. The patient will contact me should they have any queries or concerns.     Signature  Miguel Padilla MD  [facility]  [address] [place] [address]  Office: [phone]

## 2023-03-13 ENCOUNTER — OFFICE VISIT (OUTPATIENT)
Dept: FAMILY MEDICINE CLINIC | Facility: CLINIC | Age: 23
End: 2023-03-13
Payer: COMMERCIAL

## 2023-03-13 VITALS
SYSTOLIC BLOOD PRESSURE: 128 MMHG | HEART RATE: 96 BPM | TEMPERATURE: 97.8 F | HEIGHT: 72 IN | DIASTOLIC BLOOD PRESSURE: 78 MMHG | OXYGEN SATURATION: 99 % | WEIGHT: 308.25 LBS | BODY MASS INDEX: 41.75 KG/M2

## 2023-03-13 DIAGNOSIS — Z71.2 ENCOUNTER TO DISCUSS TEST RESULTS: Primary | ICD-10-CM

## 2023-03-13 DIAGNOSIS — F32.A MILD DEPRESSION: ICD-10-CM

## 2023-03-13 DIAGNOSIS — E66.01 MORBID (SEVERE) OBESITY DUE TO EXCESS CALORIES: ICD-10-CM

## 2023-03-13 NOTE — PROGRESS NOTES
Family Medicine Residency  Miguel Padilla MD    Subjective:     Aaron Colunga is a 22 y.o. male who presents for results discussion and management of medications. Pt reports mental health doing well with plan of care. Pt stable on sertraline 50 mg. Pt continues to work in full time employment and has started to increase exercise and adapt diet. Pt now focussed on healthy eating.    The following portions of the patient's history were reviewed and updated as appropriate: allergies, current medications, past family history, past medical history, past social history, past surgical history and problem list.    Past Medical Hx:  Past Medical History:   Diagnosis Date   • Acute sinusitis    • Allergic rhinitis    • Asthma 12/09/2014   • Encounter for routine child health examination without abnormal findings    • Ingrown toenail 07/24/2013   • Right arm cellulitis 12/23/2019   • Upper respiratory infection        Past Surgical Hx:  Past Surgical History:   Procedure Laterality Date   • ADENOIDECTOMY     • OTHER SURGICAL HISTORY  07/24/2013    Excision of Nail and Nail Matrix, Permanent 48109 (1)   -  YOBANI Bynum   • TOE NAIL AVULSION      great toe bilat   • TONSILLECTOMY     • TYMPANOSTOMY TUBE PLACEMENT Bilateral        Current Meds:    Current Outpatient Medications:   •  sertraline (Zoloft) 50 MG tablet, Take 1/2 tablet for 1 week then increase dose to 1 tablet daily., Disp: 30 tablet, Rfl: 5    Allergies:  Allergies   Allergen Reactions   • Clavulanic Acid Nausea And Vomiting   • Other      SUDAL:  Rash   • Penicillins Itching and Nausea And Vomiting   • Codeine Anxiety     Other reaction(s): RASH / HIVES       Family Hx:  Family History   Problem Relation Age of Onset   • Diabetes Mother    • Heart disease Mother    • Hypertension Father    • Hypertension Maternal Grandfather    • Diabetes Paternal Grandmother    • Hypertension Paternal Grandmother    • Diabetes Paternal Grandfather         Social  "History:  Social History     Socioeconomic History   • Marital status: Single   Tobacco Use   • Smoking status: Former     Packs/day: 0.50     Years: 1.00     Pack years: 0.50     Types: Cigarettes     Quit date: 2017     Years since quittin.2   • Smokeless tobacco: Never   • Tobacco comments:     Only smoked 1 year   Substance and Sexual Activity   • Alcohol use: No   • Drug use: No   • Sexual activity: Defer       Review of Systems  Review of Systems   Constitutional: Negative for activity change, appetite change, chills, diaphoresis, fatigue and fever.   HENT: Negative for congestion, dental problem, ear discharge, ear pain, hearing loss, mouth sores, nosebleeds, postnasal drip, sinus pain, sore throat, tinnitus, trouble swallowing and voice change.    Eyes: Negative for photophobia, pain, discharge and itching.   Respiratory: Negative for cough, choking, chest tightness, shortness of breath and wheezing.    Cardiovascular: Negative for chest pain, palpitations and leg swelling.   Gastrointestinal: Negative for abdominal distention, abdominal pain, blood in stool, constipation, diarrhea, nausea and vomiting.   Endocrine: Negative for cold intolerance and heat intolerance.   Genitourinary: Negative for difficulty urinating, dysuria, flank pain and hematuria.   Musculoskeletal: Negative for back pain, joint swelling and neck pain.   Skin: Negative for color change and rash.   Neurological: Negative for dizziness, tremors, seizures, weakness, light-headedness, numbness and headaches.   Hematological: Negative for adenopathy.   Psychiatric/Behavioral: Negative for behavioral problems, confusion, hallucinations, self-injury and sleep disturbance.       Objective:     /78   Pulse 96   Temp 97.8 °F (36.6 °C)   Ht 182.9 cm (72\")   Wt (!) 140 kg (308 lb 4 oz)   SpO2 99%   BMI 41.81 kg/m²   Physical Exam  Vitals and nursing note reviewed.   Constitutional:       Appearance: Normal appearance. He is " not ill-appearing or diaphoretic.   HENT:      Head: Normocephalic and atraumatic.      Right Ear: External ear normal.      Left Ear: External ear normal.      Nose: Nose normal. No rhinorrhea.      Mouth/Throat:      Mouth: Mucous membranes are moist.      Pharynx: No posterior oropharyngeal erythema.   Eyes:      General: No scleral icterus.        Right eye: No discharge.         Left eye: No discharge.      Extraocular Movements: Extraocular movements intact.   Neck:      Vascular: No carotid bruit.   Cardiovascular:      Rate and Rhythm: Normal rate and regular rhythm.      Pulses: Normal pulses.      Heart sounds: Normal heart sounds.     No gallop.   Pulmonary:      Effort: Pulmonary effort is normal.      Breath sounds: Normal breath sounds. No wheezing.   Chest:      Chest wall: No tenderness.   Abdominal:      General: Bowel sounds are normal.      Palpations: Abdomen is soft.      Tenderness: There is no rebound.   Musculoskeletal:         General: No swelling.      Cervical back: No muscular tenderness.      Right lower leg: No edema.      Left lower leg: No edema.   Lymphadenopathy:      Cervical: No cervical adenopathy.   Skin:     General: Skin is warm and dry.      Capillary Refill: Capillary refill takes less than 2 seconds.      Findings: No erythema or rash.   Neurological:      General: No focal deficit present.      Mental Status: He is alert and oriented to person, place, and time.      Motor: No weakness.   Psychiatric:         Mood and Affect: Mood normal.         Behavior: Behavior normal.         Thought Content: Thought content normal.         Judgment: Judgment normal.          Assessment   Aaron Colunga is a 22 y.o. male who presents for:    Results discussion:  Discussed CBC, CMP, Lipid Panel, HBA1C, UDS, TSH, HEP C  Educated patient in relation to lab findings.  Educated patient on lipids and HBA1c  Pt plans to adapt lifestyle, exercise and eaten patterns     BMI/Weight  management:  Educated patient on diet.  Educated patient on exercise.  Educated on healthcare benefits.  Educated patient on lipids and HBA1c  Pt plans to adapt lifestyle, exercise and eaten patterns     Care Gap Closure:  Vaccinations- discussed and educated patient.    The patient was educated on the risks, benefits and alternatives to therapy. The patient was in agreement with therapy and plan of care.    Plan     Next visit Annual Physical   Next visit refill Zoloft 50 mg if continues to be stable.  Next visit check patients medication regime.  Next visit check patients compliance to medication regime.    Diagnoses and all orders for this visit:    1. Encounter to discuss test results (Primary)    2. Mild depression (HCC)    3. Morbid (severe) obesity due to excess calories (HCC)      · Rx changes: As above  · Patient Education: Advised and educated the patient in relation to diet, lifestyle, healthcare goals and educated on glucose control, weight loss, increasing exercise levels.  · Compliance at present is estimated to be good.   · Efforts to improve compliance (if necessary) will be directed at increased exercise.    Depression screening: Depression screening performed today; result negative; no follow up needed       PHQ-2 Depression Screening  Little interest or pleasure in doing things?  0   Feeling down, depressed, or hopeless?  0   PHQ-2 Total Score  0        PHQ-2 Total Score:   0    Patient has no suicidal, no homicidal ideation.    Patient has been offered psychiatric and psychological therapy input however has refused.    Follow-up:     Return in about 11 weeks (around 5/29/2023) for Annual.    Preventative:  Health Maintenance   Topic Date Due   • ANNUAL PHYSICAL  08/11/2018   • INFLUENZA VACCINE  03/31/2023 (Originally 8/1/2022)   • Pneumococcal Vaccine 0-64 (1 - PCV) 06/14/2023 (Originally 12/5/2005)   • COVID-19 Vaccine (3 - Booster) 09/20/2023 (Originally 8/12/2021)   • TDAP/TD VACCINES (2 - Td  or Tdap) 09/20/2023 (Originally 6/3/2021)   • HEPATITIS C SCREENING  Completed     Male Preventative: Patient does Testicular self exam  Recommended: none  Vaccine Counseling: N/A    Weight  -Class: Obese Class III extreme obesity: > or equal to 40kg/m2  -Class 3 Severe Obesity (BMI >=40). Obesity-related health conditions include the following: none. Obesity is unchanged. BMI is is above average; BMI management plan is completed. We discussed portion control and increasing exercise.   eat more fruits and vegetables, decrease soda or juice intake, increase water intake, increase physical activity, reduce screen time, reduce portion size, cut out extra servings and reduce fast food intake    Alcohol use:  reports no history of alcohol use.  Nicotine status  reports that he quit smoking about 5 years ago. His smoking use included cigarettes. He has a 0.50 pack-year smoking history. He has never used smokeless tobacco.     Goals    None         RISK SCORE: 1    [facility] request #[id number], [facility] out of service. Reviewed and appropriate.     Signature  [place] [address]  Office: [phone]      This document has been electronically signed by Miguel Padilla MD on March 13, 2023 13:28 CDT      Portions of this note were copied from progress note written by Dr. Padilla; note reviewed and updated as appropriate.    Part of this note may be an electronic transcription/translation of spoken language to printed text using the Dragon Dictation System.

## 2023-09-12 ENCOUNTER — OFFICE VISIT (OUTPATIENT)
Dept: FAMILY MEDICINE CLINIC | Facility: CLINIC | Age: 23
End: 2023-09-12
Payer: COMMERCIAL

## 2023-09-12 VITALS
OXYGEN SATURATION: 97 % | TEMPERATURE: 97.2 F | WEIGHT: 290.7 LBS | SYSTOLIC BLOOD PRESSURE: 126 MMHG | DIASTOLIC BLOOD PRESSURE: 88 MMHG | HEIGHT: 72 IN | BODY MASS INDEX: 39.37 KG/M2 | HEART RATE: 99 BPM

## 2023-09-12 DIAGNOSIS — Z76.89 ENCOUNTER TO ESTABLISH CARE: ICD-10-CM

## 2023-09-12 DIAGNOSIS — F32.A MILD DEPRESSION: Primary | ICD-10-CM

## 2023-09-12 NOTE — PROGRESS NOTES
Family Medicine Residency  Roberto Root MD    Subjective:     Aaron Colunga is a 22 y.o. male who presents for an initial visit to establish care with me.  He was previously seen in this clinic for depression and anxiety. He states that his depression has not been well managed for 2 months.  He has been on several depression medications in the past.  Prozac did not work for him he was placed on Effexor and  a gene site test was ordered.  Gene site indicated that Zoloft would be the most appropriate depression treatment.  He was placed on 50 mg daily about 1 year ago.  His life has become more stressful in the last 2 months patient cites family issues and work stuff as the cause of his increased stress.  His grandfather was recently placed in a nursing home due to worsening dementia.  Pt states that he saw this coming but it is still difficult to deal with.  He has not been taking his Zoloft for approximately 1.5 wks.  Pt denied suicidal ideation during our visit.  I could not locate a recent PHQ 9 for  this patient so I had him fill out a PHQ9/RAMOS 7.  Upon reviewing the PHQ 9 the patient indicated that he did have suicidal ideation.      Pt also requests a referral to psych.  He says that he would like to talk to someone who is trained to help.  He has been utilizing family, but they can only do so much.        Past Medical Hx:  Past Medical History:   Diagnosis Date    Acute sinusitis     Allergic rhinitis     Asthma 12/09/2014    Encounter for routine child health examination without abnormal findings     Ingrown toenail 07/24/2013    Right arm cellulitis 12/23/2019    Upper respiratory infection        Past Surgical Hx:  Past Surgical History:   Procedure Laterality Date    ADENOIDECTOMY      OTHER SURGICAL HISTORY  07/24/2013    Excision of Nail and Nail Matrix, Permanent 87007 (1)   -  [facility] [place]    TOE NAIL AVULSION      great toe bilat    TONSILLECTOMY      TYMPANOSTOMY TUBE PLACEMENT Bilateral         Current Meds:    Current Outpatient Medications:     sertraline (Zoloft) 50 MG tablet, Take 1.5 tablets by mouth Daily for 30 doses., Disp: 45 tablet, Rfl: 0    Allergies:  Allergies   Allergen Reactions    Clavulanic Acid Nausea And Vomiting    Other      SUDAL:  Rash    Penicillins Itching and Nausea And Vomiting    Codeine Anxiety     Other reaction(s): RASH / HIVES       Family Hx:  Family History   Problem Relation Age of Onset    Diabetes Mother     Heart disease Mother     Hypertension Father     Hypertension Maternal Grandfather     Diabetes Paternal Grandmother     Hypertension Paternal Grandmother     Diabetes Paternal Grandfather         Social History:  Social History     Socioeconomic History    Marital status: Single   Tobacco Use    Smoking status: Former     Packs/day: 0.50     Years: 1.00     Pack years: 0.50     Types: Cigarettes     Quit date: 2017     Years since quittin.7    Smokeless tobacco: Never    Tobacco comments:     Only smoked 1 year   Substance and Sexual Activity    Alcohol use: No    Drug use: No    Sexual activity: Defer       Review of Systems  Review of Systems   Constitutional:  Negative for activity change, chills, fatigue and fever.   HENT:  Negative for congestion, ear discharge, ear pain, facial swelling and hearing loss.    Eyes:  Negative for discharge, itching and visual disturbance.   Respiratory:  Negative for cough, chest tightness and shortness of breath.    Cardiovascular:  Negative for chest pain and palpitations.   Gastrointestinal:  Negative for abdominal pain, blood in stool, constipation, diarrhea, nausea and vomiting.   Endocrine: Negative for cold intolerance and heat intolerance.   Genitourinary:  Negative for difficulty urinating, flank pain and frequency.   Musculoskeletal:  Negative for back pain and myalgias.   Skin:  Negative for rash.   Neurological:  Negative for dizziness, light-headedness, numbness and headaches.  "  Psychiatric/Behavioral:  Negative for behavioral problems.      Objective:     /88   Pulse 99   Temp 97.2 °F (36.2 °C)   Ht 182.9 cm (72\")   Wt 132 kg (290 lb 11.2 oz)   SpO2 97%   BMI 39.43 kg/m²   Physical Exam  Constitutional:       Appearance: He is obese.   HENT:      Head: Normocephalic and atraumatic.      Right Ear: External ear normal.      Left Ear: External ear normal.      Nose: Nose normal.      Mouth/Throat:      Mouth: Mucous membranes are moist.   Cardiovascular:      Rate and Rhythm: Normal rate and regular rhythm.   Pulmonary:      Effort: Pulmonary effort is normal.      Breath sounds: Normal breath sounds.   Musculoskeletal:         General: Normal range of motion.      Cervical back: Normal range of motion.   Skin:     General: Skin is warm and dry.   Neurological:      Mental Status: He is alert.   Psychiatric:         Mood and Affect: Mood normal.         Behavior: Behavior normal.        Assessment/Plan:     Diagnoses and all orders for this visit:    1. Mild depression (Primary)  -     sertraline (Zoloft) 50 MG tablet; Take 1.5 tablets by mouth Daily for 30 doses.  Dispense: 45 tablet; Refill: 0        -     Pt will return in 2 weeks to check medication compliance and talk about symptoms of depression          -     Determine the level of suicidal ideation this patient has and address it  2. Encounter to establish care        -     I will see the patient back in one month for an annual exam and will address BP, BMI, care gaps and general wellness    Rx changes: Zoloft dose increased from 50 mg to 75 mg daily     Depression screening: PHQ 9 performed in the office today has a score of 14 indicating moderate depression      Follow-up:     Return in about 2 weeks (around 9/26/2023) for medication check.    Preventative:  Health Maintenance   Topic Date Due    Pneumococcal Vaccine 0-64 (1 - PCV) Never done    ANNUAL PHYSICAL  08/11/2018    COVID-19 Vaccine (3 - Moderna series) " 09/20/2023 (Originally 8/12/2021)    TDAP/TD VACCINES (2 - Td or Tdap) 09/20/2023 (Originally 6/3/2021)    INFLUENZA VACCINE  10/01/2023    BMI FOLLOWUP  03/13/2024    HEPATITIS C SCREENING  Completed       Alcohol use:  reports no history of alcohol use.  Nicotine status  reports that he quit smoking about 5 years ago. His smoking use included cigarettes. He has a 0.50 pack-year smoking history. He has never used smokeless tobacco.     Goals    None         RISK SCORE: 3        [facility]  [address] [place] [address]  Office: [phone]  This document has been electronically signed by Roberto Root MD on September 12, 2023 14:12 CDT

## 2023-09-12 NOTE — Clinical Note
September 12, 2023     Patient: Aaron Colunga   YOB: 2000   Date of Visit: 9/12/2023       To Whom It May Concern:    It is my medical opinion that Aaron Colunga may return to work in three days.            Sincerely,        Roberto Root MD    CC: No Recipients

## 2023-09-12 NOTE — Clinical Note
September 12, 2023     Patient: Aaron Colunga   YOB: 2000   Date of Visit: 9/12/2023       To Whom It May Concern:    It is my medical opinion that Aaron Colunga may return to work in two days.         Sincerely,        Roberto Root MD    CC: No Recipients

## 2023-09-12 NOTE — PROGRESS NOTES
I was present on site during entire visit, have seen patient, reviewed the notes, assessments, and/or procedures performed by Roberto Root MD , I concur with her/his documentation of Aaron Colunga.

## 2023-09-12 NOTE — LETTER
September 12, 2023     Patient: Aaron Colunga   YOB: 2000   Date of Visit: 9/12/2023       To Whom It May Concern:    Nishant Keanu was seen in my office today at 1:30         Sincerely,        Roberto Root MD    CC: No Recipients

## 2023-09-26 ENCOUNTER — OFFICE VISIT (OUTPATIENT)
Dept: FAMILY MEDICINE CLINIC | Facility: CLINIC | Age: 23
End: 2023-09-26
Payer: COMMERCIAL

## 2023-09-26 VITALS
OXYGEN SATURATION: 98 % | BODY MASS INDEX: 38.83 KG/M2 | DIASTOLIC BLOOD PRESSURE: 76 MMHG | TEMPERATURE: 97.3 F | HEIGHT: 72 IN | WEIGHT: 286.7 LBS | HEART RATE: 88 BPM | SYSTOLIC BLOOD PRESSURE: 122 MMHG

## 2023-09-26 DIAGNOSIS — F32.A MILD DEPRESSION: Primary | ICD-10-CM

## 2023-09-26 NOTE — PROGRESS NOTES
Family Medicine Residency  Roberto Root MD    Subjective:     Aaron Colunga is a 22 y.o. male who presents for a follow up visit post medication change.  I saw this patient 2 weeks ago due to increase in depressive symptoms brought on by recent family issues. Zoloft was increased from 50 to 75 mg.  Patient states today he is feeling much better.  He says that he was able to schedule an appointment with psychology and is pleased he was able to get in so quickly.  I suggested a book on cognitive behavioral therapy that the patient said he would try to read.  We spent some time talking about his past writing.  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  His current interest is in horror stories.      We spoke about his depressive symptoms and he denies any suicidal ideation.   He states that he has too much to live for and would not want to do that to his family.     We discussed healthy eating habits and including exercise in his diet.  He has lost approximately 20 pounds since march. I encouraged him to continue making the healthy choices he has implemented since march.    Past Medical Hx:  Past Medical History:   Diagnosis Date    Acute sinusitis     Allergic rhinitis     Asthma 12/09/2014    Encounter for routine child health examination without abnormal findings     Ingrown toenail 07/24/2013    Right arm cellulitis 12/23/2019    Upper respiratory infection        Past Surgical Hx:  Past Surgical History:   Procedure Laterality Date    ADENOIDECTOMY      OTHER SURGICAL HISTORY  07/24/2013    Excision of Nail and Nail Matrix, Permanent 76584 (1)   -  [facility]. [place]    TOE NAIL AVULSION      great toe bilat    TONSILLECTOMY      TYMPANOSTOMY TUBE PLACEMENT Bilateral        Current Meds:    Current Outpatient Medications:     sertraline (Zoloft) 50 MG tablet, Take 1.5 tablets by mouth Daily for 30 doses., Disp: 45 tablet, Rfl: 0    Allergies:  Allergies   Allergen Reactions    Clavulanic Acid  "Nausea And Vomiting    Other      SUDAL:  Rash    Penicillins Itching and Nausea And Vomiting    Codeine Anxiety     Other reaction(s): RASH / HIVES       Family Hx:  Family History   Problem Relation Age of Onset    Diabetes Mother     Heart disease Mother     Hypertension Father     Hypertension Maternal Grandfather     Diabetes Paternal Grandmother     Hypertension Paternal Grandmother     Diabetes Paternal Grandfather         Social History:  Social History     Socioeconomic History    Marital status: Single   Tobacco Use    Smoking status: Former     Packs/day: 0.50     Years: 1.00     Pack years: 0.50     Types: Cigarettes     Quit date: 2017     Years since quittin.8    Smokeless tobacco: Never    Tobacco comments:     Only smoked 1 year   Substance and Sexual Activity    Alcohol use: No    Drug use: No    Sexual activity: Defer       Review of Systems  Review of Systems   Constitutional:  Negative for activity change, chills, fatigue and fever.   HENT:  Negative for congestion, ear discharge, ear pain, facial swelling and hearing loss.    Eyes:  Negative for discharge, itching and visual disturbance.   Respiratory:  Negative for cough, chest tightness and shortness of breath.    Cardiovascular:  Negative for chest pain and palpitations.   Gastrointestinal:  Negative for abdominal pain, blood in stool, constipation, diarrhea, nausea and vomiting.   Endocrine: Negative for cold intolerance and heat intolerance.   Genitourinary:  Negative for difficulty urinating, flank pain and frequency.   Musculoskeletal:  Negative for back pain and myalgias.   Skin:  Negative for rash.   Neurological:  Negative for dizziness, light-headedness, numbness and headaches.   Psychiatric/Behavioral:  Negative for behavioral problems.      Objective:     /76   Pulse 88   Temp 97.3 °F (36.3 °C)   Ht 182.9 cm (72\")   Wt 130 kg (286 lb 11.2 oz)   SpO2 98%   BMI 38.88 kg/m²   Physical Exam  Constitutional:       " Appearance: He is obese.   HENT:      Head: Normocephalic and atraumatic.      Right Ear: External ear normal.      Left Ear: External ear normal.      Nose: Nose normal.      Mouth/Throat:      Mouth: Mucous membranes are moist.   Cardiovascular:      Rate and Rhythm: Normal rate and regular rhythm.   Pulmonary:      Effort: Pulmonary effort is normal.      Breath sounds: Normal breath sounds.   Musculoskeletal:         General: Normal range of motion.      Cervical back: Normal range of motion.   Skin:     General: Skin is warm and dry.   Neurological:      Mental Status: He is alert.   Psychiatric:         Mood and Affect: Mood normal.         Behavior: Behavior normal.        Assessment/Plan:     Diagnoses and all orders for this visit:    1. Mild depression (Primary)  - Pt is stable on current dose of Zoloft will continue   - Pt will have an appointment with psychiatry in the next 2 weeks  - I suggested a book on CBT to read     Will see the patient back in 2 weeks for further titration if needed and to address care gaps: annual, Tdap and pneumococcal vaccines  Follow-up:     Return in about 2 weeks (around 10/10/2023).    Preventative:  Health Maintenance   Topic Date Due    Pneumococcal Vaccine 0-64 (1 - PCV) Never done    ANNUAL PHYSICAL  08/11/2018    TDAP/TD VACCINES (2 - Td or Tdap) 06/03/2021    COVID-19 Vaccine (3 - Moderna series) 08/12/2021    INFLUENZA VACCINE  10/01/2023    BMI FOLLOWUP  03/13/2024    HEPATITIS C SCREENING  Completed       Alcohol use:  reports no history of alcohol use.  Nicotine status  reports that he quit smoking about 5 years ago. His smoking use included cigarettes. He has a 0.50 pack-year smoking history. He has never used smokeless tobacco.     Goals    None         RISK SCORE: 3        [facility]  [address] [place], [place] [id number]  Office: [phone]  This document has been electronically signed by Roberto Root  MD on September 26, 2023 14:42 CDT

## 2023-10-02 NOTE — PROGRESS NOTES
I have seen the patient and I have reviewed the notes, assessments, and/or procedures performed by dr Root during office visit. I concur with her/his documentation and assessment and plan for Aaron Colunga.           This document has been electronically signed by Wes Sanchez MD on October 2, 2023 13:10 CDT